# Patient Record
Sex: MALE | Race: BLACK OR AFRICAN AMERICAN | NOT HISPANIC OR LATINO | Employment: OTHER | URBAN - METROPOLITAN AREA
[De-identification: names, ages, dates, MRNs, and addresses within clinical notes are randomized per-mention and may not be internally consistent; named-entity substitution may affect disease eponyms.]

---

## 2022-04-12 LAB — CRC RECOMMENDATION EXT: NORMAL

## 2022-04-22 PROBLEM — Z98.890 DIARRHEA FOLLOWING GASTROINTESTINAL SURGERY: Status: ACTIVE | Noted: 2022-04-22

## 2022-04-22 PROBLEM — R19.7 DIARRHEA FOLLOWING GASTROINTESTINAL SURGERY: Status: ACTIVE | Noted: 2022-04-22

## 2022-04-22 PROBLEM — Z90.49 S/P CHOLECYSTECTOMY: Status: ACTIVE | Noted: 2022-04-22

## 2022-04-22 PROBLEM — K43.2 INCISIONAL HERNIA OF ANTERIOR ABDOMINAL WALL WITHOUT OBSTRUCTION OR GANGRENE: Status: ACTIVE | Noted: 2022-04-22

## 2022-04-22 PROBLEM — Z90.49 S/P RIGHT COLECTOMY: Status: ACTIVE | Noted: 2022-04-22

## 2022-06-01 PROBLEM — E87.6 HYPOKALEMIA: Status: ACTIVE | Noted: 2022-06-01

## 2022-06-01 PROBLEM — R74.8 ELEVATED ALKALINE PHOSPHATASE LEVEL: Status: ACTIVE | Noted: 2022-06-01

## 2022-06-01 PROBLEM — K56.7 ILEUS: Status: ACTIVE | Noted: 2022-06-01

## 2022-06-01 PROBLEM — R17 ELEVATED BILIRUBIN: Status: ACTIVE | Noted: 2022-06-01

## 2022-11-24 PROBLEM — K61.0 PERIANAL ABSCESS: Status: ACTIVE | Noted: 2022-11-24

## 2022-12-09 PROBLEM — K56.609 SMALL BOWEL OBSTRUCTION: Status: ACTIVE | Noted: 2022-12-09

## 2023-01-10 ENCOUNTER — PATIENT OUTREACH (OUTPATIENT)
Dept: ADMINISTRATIVE | Facility: HOSPITAL | Age: 43
End: 2023-01-10

## 2023-01-11 NOTE — PROGRESS NOTES
Population Health Outreach.Records Received, hyper-linked into chart at this time. The following record(s)  below were uploaded for Health Maintenance .        COLONOSCOPY = 4.12.22

## 2023-01-27 PROBLEM — D64.9 NORMOCYTIC ANEMIA: Status: ACTIVE | Noted: 2023-01-27

## 2023-02-01 PROBLEM — T81.89XA STITCH GRANULOMA: Status: ACTIVE | Noted: 2023-02-01

## 2023-02-01 PROBLEM — M79.2 NEUROPATHIC PAIN: Status: ACTIVE | Noted: 2023-02-01
